# Patient Record
Sex: FEMALE | Race: WHITE | URBAN - METROPOLITAN AREA
[De-identification: names, ages, dates, MRNs, and addresses within clinical notes are randomized per-mention and may not be internally consistent; named-entity substitution may affect disease eponyms.]

---

## 2019-06-19 ENCOUNTER — APPOINTMENT (OUTPATIENT)
Dept: URBAN - METROPOLITAN AREA CLINIC 290 | Age: 54
Setting detail: DERMATOLOGY
End: 2019-06-19

## 2019-06-19 DIAGNOSIS — I83.89 VARICOSE VEINS OF LOWER EXTREMITIES WITH OTHER COMPLICATIONS: ICD-10-CM

## 2019-06-19 PROBLEM — I83.893 VARICOSE VEINS OF BILATERAL LOWER EXTREMITIES WITH OTHER COMPLICATIONS: Status: ACTIVE | Noted: 2019-06-19

## 2019-06-19 PROCEDURE — OTHER CONSULTATION: VEIN REMOVAL: OTHER

## 2019-06-19 PROCEDURE — 99202 OFFICE O/P NEW SF 15 MIN: CPT

## 2019-06-19 PROCEDURE — OTHER OTHER: OTHER

## 2019-06-19 ASSESSMENT — LOCATION ZONE DERM: LOCATION ZONE: LEG

## 2019-06-19 ASSESSMENT — LOCATION DETAILED DESCRIPTION DERM
LOCATION DETAILED: RIGHT PROXIMAL PRETIBIAL REGION
LOCATION DETAILED: LEFT PROXIMAL PRETIBIAL REGION

## 2019-06-19 ASSESSMENT — LOCATION SIMPLE DESCRIPTION DERM
LOCATION SIMPLE: RIGHT PRETIBIAL REGION
LOCATION SIMPLE: LEFT PRETIBIAL REGION

## 2019-06-19 NOTE — PROCEDURE: CONSULTATION: VEIN REMOVAL
Include Ceap In The Note?: No
Right Leg Inflammation: 0- None
Left Dorsalis Pedis Pulse: 2 (Easily palpable)
Left Leg Pain: 1- Occasional, not restricting activity or requiring analgesics
Left Leg Venous Hyperpigmentation: 0- None or focal low intensity (tan)
Left Leg Circumference: medium
Left Leg Venous Edema: 2- Afternoon edema above ankle
Left Leg Varicose Veins: 3- Extensive: thigh and calf or GS and SS distribution
Right Leg Venous Hyperpigmentation: 1- Diffuse, but limited in area, and old (brown)
Right Leg Varicose Veins: 2- Multiple: GS varicose veins confined to calf or thigh
Include Right Vcss In Note: Yes
Right Leg Pain: 2- Daily, moderate activity limitation, occasional analgesics
Right Leg Compression Therapy: 2- Wears elastic stocking most days
Detail Level: Zone
Left Leg Compression Therapy: 0- None or noncompliant

## 2019-06-19 NOTE — PROCEDURE: OTHER
Detail Level: Detailed
Other (Free Text): Patient will get Doppler ultrasound done and return to office for evaluation.  If varicose veins were found referred to [facility] for treatment of varicose veins.  Patient will return to our office after varicose are treated for sclerotherapy treatment of retics and spider veins
Note Text (......Xxx Chief Complaint.): This diagnosis correlates with the

## 2019-10-02 ENCOUNTER — APPOINTMENT (OUTPATIENT)
Dept: URBAN - METROPOLITAN AREA CLINIC 290 | Age: 54
Setting detail: DERMATOLOGY
End: 2019-10-02

## 2019-10-02 DIAGNOSIS — D22 MELANOCYTIC NEVI: ICD-10-CM

## 2019-10-02 DIAGNOSIS — Z12.83 ENCOUNTER FOR SCREENING FOR MALIGNANT NEOPLASM OF SKIN: ICD-10-CM

## 2019-10-02 DIAGNOSIS — L81.4 OTHER MELANIN HYPERPIGMENTATION: ICD-10-CM

## 2019-10-02 DIAGNOSIS — L82.1 OTHER SEBORRHEIC KERATOSIS: ICD-10-CM

## 2019-10-02 PROBLEM — D22.5 MELANOCYTIC NEVI OF TRUNK: Status: ACTIVE | Noted: 2019-10-02

## 2019-10-02 PROCEDURE — OTHER COUNSELING: OTHER

## 2019-10-02 PROCEDURE — OTHER MIPS QUALITY: OTHER

## 2019-10-02 PROCEDURE — 99214 OFFICE O/P EST MOD 30 MIN: CPT

## 2019-10-02 ASSESSMENT — LOCATION DETAILED DESCRIPTION DERM
LOCATION DETAILED: RIGHT SUPERIOR MEDIAL UPPER BACK
LOCATION DETAILED: RIGHT MEDIAL UPPER BACK
LOCATION DETAILED: SUPERIOR THORACIC SPINE

## 2019-10-02 ASSESSMENT — LOCATION SIMPLE DESCRIPTION DERM
LOCATION SIMPLE: RIGHT UPPER BACK
LOCATION SIMPLE: UPPER BACK

## 2019-10-02 ASSESSMENT — LOCATION ZONE DERM: LOCATION ZONE: TRUNK

## 2019-10-11 ENCOUNTER — APPOINTMENT (OUTPATIENT)
Dept: URBAN - METROPOLITAN AREA CLINIC 290 | Age: 54
Setting detail: DERMATOLOGY
End: 2019-12-08

## 2019-10-11 DIAGNOSIS — I83.89 VARICOSE VEINS OF LOWER EXTREMITIES WITH OTHER COMPLICATIONS: ICD-10-CM

## 2019-10-11 PROBLEM — I83.893 VARICOSE VEINS OF BILATERAL LOWER EXTREMITIES WITH OTHER COMPLICATIONS: Status: ACTIVE | Noted: 2019-10-11

## 2019-10-11 PROCEDURE — OTHER CONSULTATION: VEIN REMOVAL: OTHER

## 2019-10-11 PROCEDURE — OTHER OTHER: OTHER

## 2019-10-11 PROCEDURE — OTHER MIPS QUALITY: OTHER

## 2019-10-11 PROCEDURE — 99213 OFFICE O/P EST LOW 20 MIN: CPT

## 2019-10-11 ASSESSMENT — LOCATION SIMPLE DESCRIPTION DERM
LOCATION SIMPLE: LEFT PRETIBIAL REGION
LOCATION SIMPLE: RIGHT PRETIBIAL REGION

## 2019-10-11 ASSESSMENT — LOCATION ZONE DERM: LOCATION ZONE: LEG

## 2019-10-11 NOTE — PROCEDURE: OTHER
Other (Free Text): Referred to [facility] or [facility] for treatment.  Patient will return to our office for cosmetic sclerotherapy treatment of reticular and spider veins after treatment of symptomatic varicose veins
Detail Level: Detailed
Note Text (......Xxx Chief Complaint.): This diagnosis correlates with the

## 2019-10-11 NOTE — PROCEDURE: CONSULTATION: VEIN REMOVAL
Include Right Vcss In Note: Yes
Left Leg Ulcer Duration: 0- None
Left Dorsalis Pedis Pulse: 2 (Easily palpable)
Left Leg Venous Edema: 2- Afternoon edema above ankle
Right Leg: Peripheral Vascular Disease?: No
Left Leg Varicose Veins: 3- Extensive: thigh and calf or GS and SS distribution
Right Leg Venous Hyperpigmentation: 1- Diffuse, but limited in area, and old (brown)
Right Leg Varicose Veins: 2- Multiple: GS varicose veins confined to calf or thigh
Left Leg Circumference: medium
Right Leg Compression Therapy: 2- Wears elastic stocking most days
Left Leg Pain: 1- Occasional, not restricting activity or requiring analgesics
Left Leg Venous Hyperpigmentation: 0- None or focal low intensity (tan)
Left Leg Compression Therapy: 0- None or noncompliant
Right Leg Pain: 2- Daily, moderate activity limitation, occasional analgesics
Detail Level: Zone

## 2021-08-10 NOTE — HPI: EVALUATION OF SKIN LESION(S)
What Type Of Note Output Would You Prefer (Optional)?: Standard Output
Hpi Title: Evaluation of Skin Lesions
Family Member: Father, sister
[id number]